# Patient Record
Sex: FEMALE | Race: WHITE | NOT HISPANIC OR LATINO | Employment: FULL TIME | URBAN - METROPOLITAN AREA
[De-identification: names, ages, dates, MRNs, and addresses within clinical notes are randomized per-mention and may not be internally consistent; named-entity substitution may affect disease eponyms.]

---

## 2023-10-12 PROBLEM — M75.01 ADHESIVE CAPSULITIS OF RIGHT SHOULDER: Status: ACTIVE | Noted: 2023-10-12

## 2023-10-12 RX ORDER — NAPROXEN 500 MG/1
500 TABLET ORAL EVERY 12 HOURS PRN
COMMUNITY

## 2023-10-13 ENCOUNTER — EVALUATION (OUTPATIENT)
Dept: PHYSICAL THERAPY | Facility: CLINIC | Age: 58
End: 2023-10-13
Payer: COMMERCIAL

## 2023-10-13 DIAGNOSIS — M75.01 ADHESIVE CAPSULITIS OF RIGHT SHOULDER: Primary | ICD-10-CM

## 2023-10-13 PROCEDURE — 97140 MANUAL THERAPY 1/> REGIONS: CPT | Mod: GP

## 2023-10-13 PROCEDURE — 97161 PT EVAL LOW COMPLEX 20 MIN: CPT | Mod: GP

## 2023-10-13 ASSESSMENT — ENCOUNTER SYMPTOMS
OCCASIONAL FEELINGS OF UNSTEADINESS: 0
DEPRESSION: 0
LOSS OF SENSATION IN FEET: 0

## 2023-10-13 NOTE — PROGRESS NOTES
Physical Therapy Evaluation    Patient Name: Rocio Peralta  MRN: [id number]  Today's Date: 10/13/2023  Visit: 1  Referred by: Popeye   Time Calculation  Start Time: 0935  Stop Time: 1020  Time Calculation (min): 45 min  Diagnosis:  Adhesive capsulitis     PRECAUTIONS:   None     SUBJECTIVE:  Saw MD. Has frozen shoulder. Is now taking muscle relaxer's and can  move the arm more. Is OK at rest. Has pain in the front of the right dominant shoulder. Wakes at night due to pain.   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 Pain is a limiting factor. Arm is making work difficult. Goes slower at work. Twisting motion at work is most difficult - reaching to put product in box.     Pain:  4/10 - front of the shoulder.   Home Living:   - requires lifting 30#  INDP   Prior level of function:  Right handed.     OBJECTIVE:    Shoulder AROM: (degrees) Left Right   Flexion 160 155   Abduction 180 136   Extension 20 15   External Rotation 78 68   Internal Rotation 69 31     Shoulder PROM: (degrees) Left Right   Flexion    Abduction    Extension WFL    External Rotation WFL 70   Internal Rotation WFL 36     Shoulder Strength: MMT Left Right   Flexion 4+/5 4+/5*   Abduction 4+/5 4/5*   External Rotation 4/5 4/5   Internal Rotation 4/5 4-/5*     Scapular Strength: MMT Left Right   Mid Trap (Prone) 4/5 4/5   Low Trap (Prone) 4/5 4/5   Serratus Anterior (Supine) 4/5 4/5     *denotes pain with motion or muscle testing    Positive Special Tests: positive impingement testing adduction, flexion, max flexion   Palpation: pain to anterior GH joint   Posture: forward flexed, forward head, can correct posture   C spine AROM WNL     ASSESSMENT:  Antalgic movement pattern RUE. Pt with signs of impingement in anterior GH joint. Pt with pain at end range of PROM and AROM yet a springy end feel. Pt with reduction in IR and abduction with pain. Pt educated in POC, safety, posture, goals. Pt will  benefit from a guided PT program to work toward ROM, strength, and function goals for work.     TREATMENT:  PROM right shoulder  GH glides/gaps right shoulder  CP x 10 min seated     PATIENT EDUCATION:  HEP  goals  safety  POC  interventions selected  Access Code: [email or web address]  URL: [email or web address]  Date: 10/13/2023  Prepared by: Mallory Elizabeth    Exercises  - Supine Shoulder Flexion Extension AAROM with Dowel  - 2 x daily - 7 x weekly - 2 sets - 10 reps  - Supine Shoulder Abduction AAROM with Dowel  - 2 x daily - 7 x weekly - 2 sets - 10 reps  - Seated Scapular Retraction  - 2 x daily - 7 x weekly - 2 sets - 10 reps - 5 hold  - Seated Shoulder Flexion Towel Slide at Table Top  - 2 x daily - 7 x weekly - 2 sets - 10 reps  - Seated Shoulder Scaption Slide at Table Top with Forearm in Neutral  - 2 x daily - 7 x weekly - 2 sets - 10 reps  - Seated Shoulder Flexion AAROM with Pulley Behind  - 2 x daily - 7 x weekly - 2 sets - 10 reps  - Seated Shoulder Abduction AAROM with Pulley Behind  - 2 x daily - 7 x weekly - 2 sets - 10 reps  - Doorway Pec Stretch at 120 Degrees Abduction  - 2 x daily - 7 x weekly - 2 sets - 5 reps - 10 hold    PLAN:   Pt to be seen 1-2 times per week for 6-8 weeks.   Pt POC to include:  Therapeutic EX, Therapeutic ACT, NMR, Self care, Manual therapy, Gait training, CP/MHP, Education      Rehab potential:  Good   Plan of care agreement  Patient   GOALS:  Active       PT Problem       PT Goal 1       Start:  10/13/23    Expected End:  12/12/23       1) Pain will be reduced to 0/10 at rest no more than 2/10 with activity.   2) Function will be increased to be able to complete work, dressing, and hygiene indp   3) ROM will be increased to be WFL right equal to the left   4) Strength to be increased to be WNL at 5/5  5) Independent in Home Exercise Program  6) Independent return to all household and self care activity safely INDP   7) postural awareness for improved GH  position  8) improved self care activity to be pain free.                Patient Stated Goal 1       Start:  10/13/23    Expected End:  12/12/23       Use the arm without pain          Patient will show a significant change in DASH patient reported outcome tool to demonstrate subjective imporovement       Start:  10/13/23    Expected End:  12/12/23       10/50 or less

## 2023-10-13 NOTE — LETTER
October 13, 2023     Patient: Rocio Peralta   YOB: 1965   Date of Visit: 10/13/2023       To Whom it May Concern:    Rocio Peralta was seen in my clinic on 10/13/2023. She {Return to school/sport:08417}.    If you have any questions or concerns, please don't hesitate to call.         Sincerely,          Mallory Elizabeth, PT        CC: No Recipients

## 2023-10-13 NOTE — LETTER
October 13, 2023     Patient: Rocio Peralta   YOB: 1965   Date of Visit: 10/13/2023       To Whom It May Concern:    It is my medical opinion that Rocio Peralta {Work release (duty restriction):11999}.    If you have any questions or concerns, please don't hesitate to call.         Sincerely,        Mallory Elizabeth, PT    CC: No Recipients

## 2023-10-25 ENCOUNTER — APPOINTMENT (OUTPATIENT)
Dept: PHYSICAL THERAPY | Facility: CLINIC | Age: 58
End: 2023-10-25
Payer: COMMERCIAL

## 2023-11-01 ENCOUNTER — APPOINTMENT (OUTPATIENT)
Dept: PHYSICAL THERAPY | Facility: CLINIC | Age: 58
End: 2023-11-01
Payer: COMMERCIAL

## 2025-01-03 ENCOUNTER — OFFICE VISIT (OUTPATIENT)
Dept: URGENT CARE | Age: 60
End: 2025-01-03
Payer: COMMERCIAL

## 2025-01-03 VITALS
TEMPERATURE: 98.1 F | OXYGEN SATURATION: 96 % | HEART RATE: 95 BPM | SYSTOLIC BLOOD PRESSURE: 139 MMHG | DIASTOLIC BLOOD PRESSURE: 80 MMHG

## 2025-01-03 DIAGNOSIS — J18.9 PNEUMONIA OF LEFT LUNG DUE TO INFECTIOUS ORGANISM, UNSPECIFIED PART OF LUNG: Primary | ICD-10-CM

## 2025-01-03 DIAGNOSIS — J02.9 PHARYNGITIS, UNSPECIFIED ETIOLOGY: ICD-10-CM

## 2025-01-03 RX ORDER — ALBUTEROL SULFATE 90 UG/1
2 INHALANT RESPIRATORY (INHALATION) EVERY 4 HOURS PRN
Qty: 8.5 G | Refills: 0 | Status: SHIPPED | OUTPATIENT
Start: 2025-01-03 | End: 2026-01-03

## 2025-01-03 RX ORDER — METHYLPREDNISOLONE 4 MG/1
TABLET ORAL
Qty: 21 TABLET | Refills: 0 | Status: SHIPPED | OUTPATIENT
Start: 2025-01-03

## 2025-01-03 RX ORDER — AZITHROMYCIN 250 MG/1
TABLET, FILM COATED ORAL
Qty: 6 TABLET | Refills: 0 | Status: SHIPPED | OUTPATIENT
Start: 2025-01-03 | End: 2025-01-08

## 2025-01-03 NOTE — PROGRESS NOTES
Subjective   Patient ID: Sonia Araiza is a 59 y.o. female. They present today with a chief complaint of Sore Throat (Pt states sore throat x9days says feels swollen. Runny nose and watery eyes. Coughing mostly dry ).    History of Present Illness  59-year-old female coming in with complaints of sore throat, runny nose, and cough.  She states this has been going on for the last 9 days.  She states the runny nose started today.  She denies any fevers or chills.  She denies any shortness of breath.  She denies any other complaints today.    Past Medical History  Allergies as of 01/03/2025    (No Known Allergies)       (Not in a hospital admission)       No past medical history on file.    No past surgical history on file.         Review of Systems  Review of Systems:  General: No weight loss, fatigue, anorexia, insomnia, fever, chills.  ENT: Positive pharyngitis, no dry mouth, nasal congestion, ear pain  Cardiac: No chest pain, palpitations, syncope, near syncope.  Pulmonary:  No shortness of breath, Positive cough, no hemoptysis  Heme/lymph: No swollen glands, fever, bleeding  Musculoskeletal: No limb pain, joint pain, joint swelling.  Skin: No rashes  Neuro: No numbness, tingling, headaches                                 Objective    Vitals:    01/03/25 0816   BP: 139/80   Pulse: 95   Temp: 36.7 °C (98.1 °F)   SpO2: 96%     No LMP recorded.    Physical Exam  Physical Exam:  General: Vital noted, no distress. Afebrile  EENT: Eyes unremarkable, Pupils PERRLA, EOMs intact. TMs unremarkable. Posterior oropharynx with erythema and edema. Uvula in the midline and non-edematous. No PTA. No retropharyngeal mass. No César's angina.  Neck: Supple. No meningismus through full range of motion. No lymphadenopathy.  Cardiac: Regular rate and rhythm, no murmur  Pulmonary: Lungs with expiratory wheezing and rhonchi on the left,  clear breath sound on the right with good aeration.   Musculoskeletal: No peripheral edema.   Skin: No  rashes        Procedures    Point of Care Test & Imaging Results from this visit  No results found for this visit on 01/03/25.   No results found.    Diagnostic study results (if any) were reviewed by VENKATA Oakley.    Assessment/Plan   Allergies, medications, history, and pertinent labs/EKGs/Imaging reviewed by VENKATA Oakley.     Medical Decision Making  Treatment: Zithromax and medrol dose pack prescribed  Differential: 1) pneumonia, 2) pharyngitis, 3) uri  Plan: Patient will follow up with the PCP in the next 2-3 days. Return for any worsening symptoms or go to the ER for further evaluation. Patient understands return precautions and discharge insturctions.  Impression:   1) pneumonia  2) pharyngitis      Orders and Diagnoses  Diagnoses and all orders for this visit:  Pneumonia of left lung due to infectious organism, unspecified part of lung  -     azithromycin (Zithromax) 250 mg tablet; Take 2 tabs (500 mg) by mouth today, than 1 daily for 4 days.  -     albuterol (ProAir HFA) 90 mcg/actuation inhaler; Inhale 2 puffs every 4 hours if needed for wheezing or shortness of breath.  Pharyngitis, unspecified etiology  -     methylPREDNISolone (Medrol Dospak) 4 mg tablets; Follow schedule on package instructions      Medical Admin Record      Patient disposition: Home    Electronically signed by VENKATA Oakley  8:32 AM